# Patient Record
(demographics unavailable — no encounter records)

---

## 2024-12-30 NOTE — ADDENDUM
[FreeTextEntry1] : This note was partly authored by Watson Nicholas working as a scribe for CAN Davis. I, CAN Davis, have reviewed the content of this note and confirm it is true and accurate. I personally performed the history and physical examination and made all the decisions. 12/30/2024.

## 2024-12-30 NOTE — HISTORY OF PRESENT ILLNESS
[FreeTextEntry1] : 12/30/2024: Mr. BURGOS is 75-year-old male with DM and h/o CVA w c/o UUI and no other obstructive LUTS.  Pt stopped Tamsulosin as it immediately bothered him and he had lethargy and weakness; He was started on Oxybutynin 5 mg po BID, and instructed to time void.  As per wife, patient has only been taking Oxybutynin 5 mg one tablet daily, as he was experiencing severe dizziness on twice daily.   Urinary has remained unchanged since last visit years ago, leakage is the same, with 2 pads per day (improvement prior to starting medication) Of note, patient's wife reports that patient has less leakage when he does not hold urine for prolonged amount of time when watching tv.   Pt denies burning, or dysuria [place], No straining/pushing when voiding.   Improved renal Fxn with Cr 1.07 from outside lab (PCP at [facility]).  No PSA  Pt is scheduled for angioplasty in 1 month at [facility].

## 2024-12-30 NOTE — PHYSICAL EXAM
[Normal Appearance] : normal appearance [General Appearance - In No Acute Distress] : no acute distress [] : no respiratory distress [Skin Color & Pigmentation] : normal skin color and pigmentation [Oriented To Time, Place, And Person] : oriented to person, place, and time [Affect] : the affect was normal [Mood] : the mood was normal [de-identified] : Residual 91 cc (1hr after voiding) [de-identified] : Pt uses walker

## 2024-12-30 NOTE — ASSESSMENT
[FreeTextEntry1] : Residual today 91 cc.  Last voided 1 hr ago. (Not retaining).   Pt will continue Oxybutynin ER 5 mg one oral tablet daily. Side effects reviewed and patient is aware of Dry mouth, dry eye, constipation and even cognitive decline/dementia is taken long term. Renewal provided for 1 year.   Told patient to enquire about PSA screen to his PCP.  If he has PSA records, send them to me.   Follow up in one year; earlier if needed.

## 2024-12-30 NOTE — PHYSICAL EXAM
[Normal Appearance] : normal appearance [General Appearance - In No Acute Distress] : no acute distress [] : no respiratory distress [Skin Color & Pigmentation] : normal skin color and pigmentation [Oriented To Time, Place, And Person] : oriented to person, place, and time [Affect] : the affect was normal [Mood] : the mood was normal [de-identified] : Residual 91 cc (1hr after voiding) [de-identified] : Pt uses walker

## 2024-12-30 NOTE — HISTORY OF PRESENT ILLNESS
[FreeTextEntry1] : 12/30/2024: Mr. BURGOS is 75-year-old male with DM and h/o CVA w c/o UUI and no other obstructive LUTS.  Pt stopped Tamsulosin as it immediately bothered him and he had lethargy and weakness; He was started on Oxybutynin 5 mg po BID, and instructed to time void.  As per wife, patient has only been taking Oxybutynin 5 mg one tablet daily, as he was experiencing severe dizziness on twice daily.   Urinary has remained unchanged since last visit years ago, leakage is the same, with 2 pads per day (improvement prior to starting medication) Of note, patient's wife reports that patient has less leakage when he does not hold urine for prolonged amount of time when watching tv.   Pt denies burning, or dysuria [place], No straining/pushing when voiding.   Improved renal Fxn with Cr 1.07 from outside lab (PCP at [facility]).  No PSA  Pt is scheduled for angioplasty in 1 month at [facility].